# Patient Record
Sex: FEMALE | Race: WHITE | NOT HISPANIC OR LATINO | URBAN - METROPOLITAN AREA
[De-identification: names, ages, dates, MRNs, and addresses within clinical notes are randomized per-mention and may not be internally consistent; named-entity substitution may affect disease eponyms.]

---
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
## 2023-01-21 ENCOUNTER — INPATIENT (INPATIENT)
Facility: HOSPITAL | Age: 23
LOS: 0 days | Discharge: HOME | DRG: 343 | End: 2023-01-22
Attending: STUDENT IN AN ORGANIZED HEALTH CARE EDUCATION/TRAINING PROGRAM | Admitting: HOSPITALIST
Payer: MEDICAID

## 2023-01-21 VITALS
DIASTOLIC BLOOD PRESSURE: 72 MMHG | OXYGEN SATURATION: 98 % | SYSTOLIC BLOOD PRESSURE: 107 MMHG | RESPIRATION RATE: 18 BRPM | WEIGHT: 120.59 LBS | HEART RATE: 115 BPM | TEMPERATURE: 98 F

## 2023-01-21 DIAGNOSIS — K35.80 UNSPECIFIED ACUTE APPENDICITIS: ICD-10-CM

## 2023-01-21 LAB
ALBUMIN SERPL ELPH-MCNC: 3.8 G/DL — SIGNIFICANT CHANGE UP (ref 3.3–5)
ALP SERPL-CCNC: 104 U/L — SIGNIFICANT CHANGE UP (ref 40–120)
ALT FLD-CCNC: 17 U/L — SIGNIFICANT CHANGE UP (ref 10–45)
ANION GAP SERPL CALC-SCNC: 11 MMOL/L — SIGNIFICANT CHANGE UP (ref 5–17)
APPEARANCE UR: CLEAR — SIGNIFICANT CHANGE UP
AST SERPL-CCNC: 19 U/L — SIGNIFICANT CHANGE UP (ref 10–40)
BACTERIA # UR AUTO: ABNORMAL /HPF
BASOPHILS # BLD AUTO: 0.02 K/UL — SIGNIFICANT CHANGE UP (ref 0–0.2)
BASOPHILS NFR BLD AUTO: 0.2 % — SIGNIFICANT CHANGE UP (ref 0–2)
BILIRUB SERPL-MCNC: 0.4 MG/DL — SIGNIFICANT CHANGE UP (ref 0.2–1.2)
BILIRUB UR-MCNC: NEGATIVE — SIGNIFICANT CHANGE UP
BUN SERPL-MCNC: 7 MG/DL — SIGNIFICANT CHANGE UP (ref 7–23)
CALCIUM SERPL-MCNC: 8.8 MG/DL — SIGNIFICANT CHANGE UP (ref 8.4–10.5)
CHLORIDE SERPL-SCNC: 104 MMOL/L — SIGNIFICANT CHANGE UP (ref 96–108)
CO2 SERPL-SCNC: 20 MMOL/L — LOW (ref 22–31)
COLOR SPEC: YELLOW — SIGNIFICANT CHANGE UP
CREAT SERPL-MCNC: 0.54 MG/DL — SIGNIFICANT CHANGE UP (ref 0.5–1.3)
DIFF PNL FLD: ABNORMAL
EGFR: 135 ML/MIN/1.73M2 — SIGNIFICANT CHANGE UP
ELLIPTOCYTES BLD QL SMEAR: SLIGHT — SIGNIFICANT CHANGE UP
EOSINOPHIL # BLD AUTO: 0.06 K/UL — SIGNIFICANT CHANGE UP (ref 0–0.5)
EOSINOPHIL NFR BLD AUTO: 0.7 % — SIGNIFICANT CHANGE UP (ref 0–6)
EPI CELLS # UR: SIGNIFICANT CHANGE UP
GLUCOSE SERPL-MCNC: 105 MG/DL — HIGH (ref 70–99)
GLUCOSE UR QL: NEGATIVE — SIGNIFICANT CHANGE UP
HCT VFR BLD CALC: 32.8 % — LOW (ref 34.5–45)
HGB BLD-MCNC: 9.7 G/DL — LOW (ref 11.5–15.5)
HYPOCHROMIA BLD QL: SIGNIFICANT CHANGE UP
IMM GRANULOCYTES NFR BLD AUTO: 0.6 % — SIGNIFICANT CHANGE UP (ref 0–0.9)
KETONES UR-MCNC: NEGATIVE — SIGNIFICANT CHANGE UP
LEUKOCYTE ESTERASE UR-ACNC: ABNORMAL
LIDOCAIN IGE QN: 74 U/L — SIGNIFICANT CHANGE UP (ref 73–393)
LYMPHOCYTES # BLD AUTO: 0.61 K/UL — LOW (ref 1–3.3)
LYMPHOCYTES # BLD AUTO: 7.5 % — LOW (ref 13–44)
MANUAL SMEAR VERIFICATION: SIGNIFICANT CHANGE UP
MCHC RBC-ENTMCNC: 18.9 PG — LOW (ref 27–34)
MCHC RBC-ENTMCNC: 29.6 GM/DL — LOW (ref 32–36)
MCV RBC AUTO: 63.9 FL — LOW (ref 80–100)
MICROCYTES BLD QL: SIGNIFICANT CHANGE UP
MONOCYTES # BLD AUTO: 0.47 K/UL — SIGNIFICANT CHANGE UP (ref 0–0.9)
MONOCYTES NFR BLD AUTO: 5.8 % — SIGNIFICANT CHANGE UP (ref 2–14)
NEUTROPHILS # BLD AUTO: 6.88 K/UL — SIGNIFICANT CHANGE UP (ref 1.8–7.4)
NEUTROPHILS NFR BLD AUTO: 85.2 % — HIGH (ref 43–77)
NITRITE UR-MCNC: NEGATIVE — SIGNIFICANT CHANGE UP
NRBC # BLD: 0 /100 WBCS — SIGNIFICANT CHANGE UP (ref 0–0)
PH UR: 5 — SIGNIFICANT CHANGE UP (ref 5–8)
PLAT MORPH BLD: NORMAL — SIGNIFICANT CHANGE UP
PLATELET # BLD AUTO: 295 K/UL — SIGNIFICANT CHANGE UP (ref 150–400)
POTASSIUM SERPL-MCNC: 3.7 MMOL/L — SIGNIFICANT CHANGE UP (ref 3.5–5.3)
POTASSIUM SERPL-SCNC: 3.7 MMOL/L — SIGNIFICANT CHANGE UP (ref 3.5–5.3)
PROT SERPL-MCNC: 7.3 G/DL — SIGNIFICANT CHANGE UP (ref 6–8.3)
PROT UR-MCNC: 30 MG/DL
RBC # BLD: 5.13 M/UL — SIGNIFICANT CHANGE UP (ref 3.8–5.2)
RBC # FLD: 18.1 % — HIGH (ref 10.3–14.5)
RBC BLD AUTO: ABNORMAL
RBC CASTS # UR COMP ASSIST: ABNORMAL /HPF (ref 0–4)
SARS-COV-2 RNA SPEC QL NAA+PROBE: SIGNIFICANT CHANGE UP
SODIUM SERPL-SCNC: 135 MMOL/L — SIGNIFICANT CHANGE UP (ref 135–145)
SP GR SPEC: 1.02 — SIGNIFICANT CHANGE UP (ref 1.01–1.02)
UROBILINOGEN FLD QL: NEGATIVE — SIGNIFICANT CHANGE UP
WBC # BLD: 8.09 K/UL — SIGNIFICANT CHANGE UP (ref 3.8–10.5)
WBC # FLD AUTO: 8.09 K/UL — SIGNIFICANT CHANGE UP (ref 3.8–10.5)
WBC UR QL: >50 /HPF (ref 0–5)

## 2023-01-21 DEVICE — STAPLER COVIDIEN TRI-STAPLE 45MM TAN RELOAD: Type: IMPLANTABLE DEVICE | Status: FUNCTIONAL

## 2023-01-21 RX ORDER — SODIUM CHLORIDE 9 MG/ML
1000 INJECTION, SOLUTION INTRAVENOUS
Refills: 0 | Status: DISCONTINUED | OUTPATIENT
Start: 2023-01-21 | End: 2023-01-21

## 2023-01-21 RX ORDER — PIPERACILLIN AND TAZOBACTAM 4; .5 G/20ML; G/20ML
3.38 INJECTION, POWDER, LYOPHILIZED, FOR SOLUTION INTRAVENOUS EVERY 8 HOURS
Refills: 0 | Status: DISCONTINUED | OUTPATIENT
Start: 2023-01-21 | End: 2023-01-22

## 2023-01-21 RX ORDER — ONDANSETRON 8 MG/1
4 TABLET, FILM COATED ORAL ONCE
Refills: 0 | Status: COMPLETED | OUTPATIENT
Start: 2023-01-21 | End: 2023-01-21

## 2023-01-21 RX ORDER — ONDANSETRON 8 MG/1
4 TABLET, FILM COATED ORAL EVERY 8 HOURS
Refills: 0 | Status: DISCONTINUED | OUTPATIENT
Start: 2023-01-21 | End: 2023-01-22

## 2023-01-21 RX ORDER — ONDANSETRON 8 MG/1
4 TABLET, FILM COATED ORAL ONCE
Refills: 0 | Status: DISCONTINUED | OUTPATIENT
Start: 2023-01-21 | End: 2023-01-21

## 2023-01-21 RX ORDER — PIPERACILLIN AND TAZOBACTAM 4; .5 G/20ML; G/20ML
3.38 INJECTION, POWDER, LYOPHILIZED, FOR SOLUTION INTRAVENOUS ONCE
Refills: 0 | Status: DISCONTINUED | OUTPATIENT
Start: 2023-01-21 | End: 2023-01-21

## 2023-01-21 RX ORDER — SODIUM CHLORIDE 9 MG/ML
1000 INJECTION INTRAMUSCULAR; INTRAVENOUS; SUBCUTANEOUS ONCE
Refills: 0 | Status: COMPLETED | OUTPATIENT
Start: 2023-01-21 | End: 2023-01-21

## 2023-01-21 RX ORDER — MORPHINE SULFATE 50 MG/1
2 CAPSULE, EXTENDED RELEASE ORAL ONCE
Refills: 0 | Status: DISCONTINUED | OUTPATIENT
Start: 2023-01-21 | End: 2023-01-21

## 2023-01-21 RX ORDER — MORPHINE SULFATE 50 MG/1
4 CAPSULE, EXTENDED RELEASE ORAL EVERY 6 HOURS
Refills: 0 | Status: DISCONTINUED | OUTPATIENT
Start: 2023-01-21 | End: 2023-01-22

## 2023-01-21 RX ORDER — HYDROMORPHONE HYDROCHLORIDE 2 MG/ML
0.5 INJECTION INTRAMUSCULAR; INTRAVENOUS; SUBCUTANEOUS
Refills: 0 | Status: DISCONTINUED | OUTPATIENT
Start: 2023-01-21 | End: 2023-01-21

## 2023-01-21 RX ORDER — HYDROMORPHONE HYDROCHLORIDE 2 MG/ML
1 INJECTION INTRAMUSCULAR; INTRAVENOUS; SUBCUTANEOUS
Refills: 0 | Status: DISCONTINUED | OUTPATIENT
Start: 2023-01-21 | End: 2023-01-21

## 2023-01-21 RX ORDER — PIPERACILLIN AND TAZOBACTAM 4; .5 G/20ML; G/20ML
3.38 INJECTION, POWDER, LYOPHILIZED, FOR SOLUTION INTRAVENOUS ONCE
Refills: 0 | Status: COMPLETED | OUTPATIENT
Start: 2023-01-21 | End: 2023-01-21

## 2023-01-21 RX ORDER — MORPHINE SULFATE 50 MG/1
2 CAPSULE, EXTENDED RELEASE ORAL EVERY 6 HOURS
Refills: 0 | Status: DISCONTINUED | OUTPATIENT
Start: 2023-01-21 | End: 2023-01-22

## 2023-01-21 RX ORDER — LANOLIN ALCOHOL/MO/W.PET/CERES
3 CREAM (GRAM) TOPICAL AT BEDTIME
Refills: 0 | Status: DISCONTINUED | OUTPATIENT
Start: 2023-01-21 | End: 2023-01-22

## 2023-01-21 RX ORDER — ACETAMINOPHEN 500 MG
650 TABLET ORAL EVERY 6 HOURS
Refills: 0 | Status: DISCONTINUED | OUTPATIENT
Start: 2023-01-21 | End: 2023-01-22

## 2023-01-21 RX ADMIN — PIPERACILLIN AND TAZOBACTAM 200 GRAM(S): 4; .5 INJECTION, POWDER, LYOPHILIZED, FOR SOLUTION INTRAVENOUS at 14:41

## 2023-01-21 RX ADMIN — MORPHINE SULFATE 2 MILLIGRAM(S): 50 CAPSULE, EXTENDED RELEASE ORAL at 11:01

## 2023-01-21 RX ADMIN — SODIUM CHLORIDE 1000 MILLILITER(S): 9 INJECTION INTRAMUSCULAR; INTRAVENOUS; SUBCUTANEOUS at 11:01

## 2023-01-21 RX ADMIN — ONDANSETRON 4 MILLIGRAM(S): 8 TABLET, FILM COATED ORAL at 11:01

## 2023-01-21 RX ADMIN — PIPERACILLIN AND TAZOBACTAM 25 GRAM(S): 4; .5 INJECTION, POWDER, LYOPHILIZED, FOR SOLUTION INTRAVENOUS at 21:11

## 2023-01-21 NOTE — ED ADULT NURSE REASSESSMENT NOTE - NS ED NURSE REASSESS COMMENT FT1
All patient belongings including patient phone and gold colored necklace given to Alvaro patient's boyfriend at bedside.

## 2023-01-21 NOTE — ED PROVIDER NOTE - OBJECTIVE STATEMENT
22 yr old female with no pmhx presents with abdominal pain since 4 am which woke her up from sleep. associated nausea and vomiting. No diarrhea. No known fever, chills, chest pain, sob, urinary complaints, diarrhea, or any other  Pt sexual active, last period about 2 week ago, received BC shot about 2 weeks ago. No vaginal discharge or bleeding.

## 2023-01-21 NOTE — PROGRESS NOTE ADULT - ASSESSMENT
PLAN:    -serial exams, any changes in exam to be escelated to surgical team immedietly  -pain control  -finish post op anbx  -IVFs until taking adequate PO  -advance diet per surgical team  -dressing changes per surgical team  -DVT prophylaxis  -AM labs

## 2023-01-21 NOTE — ED PROVIDER NOTE - PHYSICAL EXAMINATION
Encounter for palliative care
Gen:  alert, awake, no acute distress  Head:  atraumatic, normocephalic  HEENT: PERRLA, EOMI, normal nose, normal oropharynx, no tonsillar edema, erythema, or exudate  CV:  rrr, nl S1, S2, no m/r/g  Pulm:  lungs CTA b/l  Abd: soft non distended, ttp rlq, +rovsing  MSK:  moving all extremities, no back midline ttp, no stepoffs, no cva TTP  Neuro:  grossly intact, no focal deficits  Skin:  clear, dry, intact  Psych: AOx3, normal affect, no apparent risk to self or others

## 2023-01-21 NOTE — CONSULT NOTE ADULT - CONSULT REASON
From: Chase Roberts  To: Tyron Georges NP  Sent: 2/22/2018 11:37 AM EST  Subject: Medication Renewal Request    Original authorizing provider: RICKIE Hess.  Chicho Ferrer would like a refill of the following medications:  PROAIR HFA 90 mcg/actuation inhaler Tyron Georges NP]  SUMAtriptan (IMITREX) 100 mg tablet Tyron Georges NP]    Preferred pharmacy: [address] [place] [address]    Comment:  a new prescibiton needs to be sent in the old one expires on the dates above    Medication renewals requested in this message routed to other providers:  EPINEPHrine (EPIPEN 2-AWA) 0.3 mg/0.3 mL injection Beatriz Acevedo MD]  omeprazole (PRILOSEC) 20 mg capsule Cristi Elias NP]
meds sent
abdominal pain

## 2023-01-21 NOTE — ED PROVIDER NOTE - CLINICAL SUMMARY MEDICAL DECISION MAKING FREE TEXT BOX
22 yr old female with no pmhx presents with abdominal pain since 4 am which woke her up from sleep. associated nausea and vomiting. No diarrhea. No known fever, chills, chest pain, sob, urinary complaints, diarrhea, or any other  Pt sexual active, last period about 2 week ago, received BC shot about 2 weeks ago. No vaginal discharge or bleeding.   RLQ tenderness on exam , pelvic unremarkable   will check labs, ct abd/pelvis r/o appendicitis 22 yr old female with no pmhx presents with abdominal pain since 4 am which woke her up from sleep. associated nausea and vomiting. No diarrhea. No known fever, chills, chest pain, sob, urinary complaints, diarrhea, or any other  Pt sexual active, last period about 2 week ago, received BC shot about 2 weeks ago. No vaginal discharge or bleeding.   RLQ tenderness on exam , pelvic unremarkable   will check labs, ct abd/pelvis r/o appendicitis    +appendictis, admission, d/w dr wilhelm and dr palmer

## 2023-01-21 NOTE — CONSULT NOTE ADULT - SUBJECTIVE AND OBJECTIVE BOX
Enoch #[id number]     22 year old Bhutanese speaking female with boyfriend by her side presented to the ER c/o severe abdominal pain which began yesterday am . Patient states that she has not been  able to eat anything since yesterday .    She also c/o fever, nausea and vomiting several times.    She c/o of pain when  ambulating and it subsides when lying still.   Her last menstrual period was Jan 1st.  She has an irregular cycle with intermittent bleeding.   She saw gynecologist last week and is presently on BCP's.   She denies dysuria.      CT scan   < from: CT Abdomen and Pelvis w/ Oral Cont and w/ IV Cont (01.21.23 @ 13:23) >    BOWEL: No bowel obstruction. Appendix is abnormally thickened measuring   8-10 mm. There is evidence of multiple appendicoliths. The appendix is   best delineated on sagittal imaging (601:50-56). No discernible   periappendiceal inflammatory change.  PERITONEUM: No ascites.  VESSELS: Within normal limits.  RETROPERITONEUM/LYMPH NODES: No lymphadenopathy.  ABDOMINAL WALL:Within normal limits.  BONES: Within normal limits.    IMPRESSION:  Imaging findings are suggestive of acute appendicitis with evidence of   appendicoliths but need to be correlated clinically.    < end of copied text >    Vital Signs Last 24 Hrs  T(C): 36.4 (21 Jan 2023 10:10), Max: 36.4 (21 Jan 2023 10:10)  T(F): 97.5 (21 Jan 2023 10:10), Max: 97.5 (21 Jan 2023 10:10)  HR: 94 (21 Jan 2023 15:42) (86 - 115)  BP: 107/72 (21 Jan 2023 10:10) (107/72 - 107/72)  RR: 18 (21 Jan 2023 15:42) (18 - 18)  SpO2: 97% (21 Jan 2023 15:42) (97% - 98%)    Parameters below as of 21 Jan 2023 10:10  Patient On (Oxygen Delivery Method): room air    SH:  Single , works at [employer],           Lives with boyfriend    Tob:  daily Vaping   ETOH:  socially     PAST MEDICAL & SURGICAL HISTORY:  No pertinent past medical history      No significant past surgical history      Home Medications:  BCP    PE:  Alert and oriented X 3   Lungs:  CTA   Cor:  RR   Abd:   softly distended, + tenderness in the periumbilical area , RUQ , RLQ and some radiation to the LLQ  voiding, denies dysuria  Ext:  w/o edema w/o calf discomfort                          9.7    8.09  )-----------( 295      ( 21 Jan 2023 10:30 )             32.8   01-21    135  |  104  |  7   ----------------------------<  105<H>  3.7   |  20<L>  |  0.54    Ca    8.8      21 Jan 2023 10:30    TPro  7.3  /  Alb  3.8  /  TBili  0.4  /  DBili  x   /  AST  19  /  ALT  17  /  AlkPhos  104  01-21    Urine pregnancy   Covid test pending   Enoch #[id number]     22 year old Cambodian speaking female with boyfriend by her side presented to the ER c/o severe abdominal pain which began yesterday am . Patient states that she has not been  able to eat anything since yesterday .    She also c/o fever, nausea and vomiting several times.    She c/o of pain when  ambulating and it subsides when lying still.   Her last menstrual period was Jan 1st.  She has an irregular cycle with intermittent bleeding.   She saw gynecologist last week and is presently on BCP's.   She denies dysuria.      CT scan   < from: CT Abdomen and Pelvis w/ Oral Cont and w/ IV Cont (01.21.23 @ 13:23) >    BOWEL: No bowel obstruction. Appendix is abnormally thickened measuring   8-10 mm. There is evidence of multiple appendicoliths. The appendix is   best delineated on sagittal imaging (601:50-56). No discernible   periappendiceal inflammatory change.  PERITONEUM: No ascites.  VESSELS: Within normal limits.  RETROPERITONEUM/LYMPH NODES: No lymphadenopathy.  ABDOMINAL WALL:Within normal limits.  BONES: Within normal limits.    IMPRESSION:  Imaging findings are suggestive of acute appendicitis with evidence of   appendicoliths but need to be correlated clinically.    < end of copied text >    Vital Signs Last 24 Hrs  T(C): 36.4 (21 Jan 2023 10:10), Max: 36.4 (21 Jan 2023 10:10)  T(F): 97.5 (21 Jan 2023 10:10), Max: 97.5 (21 Jan 2023 10:10)  HR: 94 (21 Jan 2023 15:42) (86 - 115)  BP: 107/72 (21 Jan 2023 10:10) (107/72 - 107/72)  RR: 18 (21 Jan 2023 15:42) (18 - 18)  SpO2: 97% (21 Jan 2023 15:42) (97% - 98%)    Parameters below as of 21 Jan 2023 10:10  Patient On (Oxygen Delivery Method): room air    SH:  Single , works at [employer],           Lives with boyfriend    Tob:  daily Vaping   ETOH:  socially     PAST MEDICAL & SURGICAL HISTORY:  No pertinent past medical history      No significant past surgical history      Home Medications:  BCP    PE:  Alert and oriented X 3   Lungs:  CTA   Cor:  RR   Abd:   softly distended, + tenderness in the periumbilical area , RUQ , RLQ and some radiation to the LLQ  voiding, denies dysuria  Ext:  w/o edema w/o calf discomfort                          9.7    8.09  )-----------( 295      ( 21 Jan 2023 10:30 )             32.8   01-21    135  |  104  |  7   ----------------------------<  105<H>  3.7   |  20<L>  |  0.54    Ca    8.8      21 Jan 2023 10:30    TPro  7.3  /  Alb  3.8  /  TBili  0.4  /  DBili  x   /  AST  19  /  ALT  17  /  AlkPhos  104  01-21    Urine pregnancy negative   Covid test pending

## 2023-01-21 NOTE — H&P ADULT - NSHPLABSRESULTS_GEN_ALL_CORE
.                            9.7    8.09  )-----------( 295      ( 2023 10:30 )             32.8           135  |  104  |  7   ----------------------------<  105  3.7   |  20  |  0.54    Ca    8.8      2023 10:30    TPro  7.3  /  Alb  3.8  /  TBili  0.4  /  DBili  x   /  AST  19  /  ALT  17  /  AlkPhos  104      Urinalysis Basic - ( 2023 10:30 )    Color: Yellow / Appearance: Clear / S.020 / pH: x  Gluc: x / Ketone: Negative  / Bili: Negative / Urobili: Negative   Blood: x / Protein: 30 mg/dL / Nitrite: Negative   Leuk Esterase: Moderate / RBC: 5-10 /HPF / WBC >50 /HPF   Sq Epi: x / Non Sq Epi: Neg.-Few / Bacteria: Few /HPF      Lipase, Serum: 74 U/L (23 @ 10:30)    EKG: PENDING    < from: Xray Chest 1 View AP/PA (23 @ 14:54) >    No acute pulmonary pathology.    < from: CT Abdomen and Pelvis w/ Oral Cont and w/ IV Cont (23 @ 13:23) >    Imaging findings are suggestive of acute appendicitis with evidence of   appendicoliths but need to be correlated clinically.    < end of copied text >    Case d/w Dr Tavarez - surgery - states will take patient to OR today

## 2023-01-21 NOTE — H&P ADULT - ASSESSMENT
22F Malay-speaking with no sig PMHx a/w acute abdominal pain and dx with acute appendicitis    #Acute appendicitis with localized peritonitis  -Pain control  -NPO   -OR today  -Low risk for surgery. Medically optimized pending EKG.    #Preoperative Assessment  Revised Cardiac Risk Assessment   [  ]History of ischemic heart disease   [  ]History of congestive heart failure   [  ]History of cerebrovascular disease (stroke or transient ischemic attack)   [  ]History of diabetes requiring preoperative insulin use   [  ]Chronic kidney disease (creatinine > 2 mg/dL)   [  ]Undergoing suprainguinal vascular, intraperitoneal, or intrathoracic surgery     0 predictors = 0.4%, 1 predictor = 1.0%, 2 predictors = 2.4%, > 3 predictors = 5.4%    * Overall this patient has a 0.4    % risk of cardiac death, nonfatal myocardial infarction, and nonfatal cardiac arrest perioperatively.     Patient does not have any known history of severe valvular disease and is not in decompensated CHF. No recent MI. Baseline functional capacity is > 4 METS. Patient is currently hemodynamically stable. Patient is medically optimized at this time despite the inherent risks of surgery. (EKG pending)    Case d/w patient and patient's boyfriend (with permission) who was present at bedside for encounter.    22F Telugu-speaking with no sig PMHx a/w acute abdominal pain and dx with acute appendicitis    #Acute appendicitis with localized peritonitis  -Pain control  -NPO   -OR today  -Low risk for surgery. Medically optimized pending EKG.    #Microcytic Anemia  -Will need outpatient workup for this  -Check ferritin in AM (if still here)      #Preoperative Assessment  Revised Cardiac Risk Assessment   [  ]History of ischemic heart disease   [  ]History of congestive heart failure   [  ]History of cerebrovascular disease (stroke or transient ischemic attack)   [  ]History of diabetes requiring preoperative insulin use   [  ]Chronic kidney disease (creatinine > 2 mg/dL)   [  ]Undergoing suprainguinal vascular, intraperitoneal, or intrathoracic surgery     0 predictors = 0.4%, 1 predictor = 1.0%, 2 predictors = 2.4%, > 3 predictors = 5.4%    * Overall this patient has a 0.4    % risk of cardiac death, nonfatal myocardial infarction, and nonfatal cardiac arrest perioperatively.     Patient does not have any known history of severe valvular disease and is not in decompensated CHF. No recent MI. Baseline functional capacity is > 4 METS. Patient is currently hemodynamically stable. Patient is medically optimized at this time despite the inherent risks of surgery. (EKG pending)    Case d/w patient and patient's boyfriend (with permission) who was present at bedside for encounter.    22F Lithuanian-speaking with no sig PMHx a/w acute abdominal pain and dx with acute appendicitis    #Acute appendicitis with localized peritonitis  -Pain control  -NPO   -OR today  -Low risk for surgery. Medically optimized pending EKG.    #Microcytic Anemia  -Will need outpatient workup for this  -Check ferritin in AM (if still here)    #Abnormal urinalysis  -"Asymptomatic"  -Repeat as outpatient      #Preoperative Assessment  Revised Cardiac Risk Assessment   [  ]History of ischemic heart disease   [  ]History of congestive heart failure   [  ]History of cerebrovascular disease (stroke or transient ischemic attack)   [  ]History of diabetes requiring preoperative insulin use   [  ]Chronic kidney disease (creatinine > 2 mg/dL)   [  ]Undergoing suprainguinal vascular, intraperitoneal, or intrathoracic surgery     0 predictors = 0.4%, 1 predictor = 1.0%, 2 predictors = 2.4%, > 3 predictors = 5.4%    * Overall this patient has a 0.4    % risk of cardiac death, nonfatal myocardial infarction, and nonfatal cardiac arrest perioperatively.     Patient does not have any known history of severe valvular disease and is not in decompensated CHF. No recent MI. Baseline functional capacity is > 4 METS. Patient is currently hemodynamically stable. Patient is medically optimized at this time despite the inherent risks of surgery. (EKG pending)    Case d/w patient and patient's boyfriend (with permission) who was present at bedside for encounter.

## 2023-01-21 NOTE — H&P ADULT - NSHPPHYSICALEXAM_GEN_ALL_CORE
Vital Signs Last 24 Hrs  T(F): 97.5 (21 Jan 2023 10:10), Max: 97.5 (21 Jan 2023 10:10)  HR: 94 (21 Jan 2023 15:42) (86 - 115)  BP: 107/72 (21 Jan 2023 10:10) (107/72 - 107/72)  RR: 18 (21 Jan 2023 15:42) (18 - 18)  SpO2: 97% (21 Jan 2023 15:42) (97% - 98%)    PHYSICAL EXAM:  GENERAL: NAD  HEAD:  Atraumatic, Normocephalic  EYES: EOMI, conjunctiva and sclera clear  ENMT: No gross hearing impairment, Moist mucous membranes, Good dentition, no thrush  NECK: Supple, No JVD  CHEST/LUNG: Clear to auscultation bilaterally, good air entry, non-labored breathing  HEART: RRR; S1/S2, No murmur  ABDOMEN: Soft, lower abdominal tenderness right>left, mildly distended; Bowel sounds present  VASCULAR: Normal pulses, Normal capillary refill  EXTREMITIES: No calf tenderness, No cyanosis, No pitting edema  LYMPH: Normal; No lymphadenopathy noted  SKIN: Warm, Intact, No rashes noted  PSYCH: Normal mood, Normal affect  NERVOUS SYSTEM:  A/O x3, Good concentration; CN 2-12 intact, No focal deficits

## 2023-01-21 NOTE — ED PROVIDER NOTE - ATTENDING APP SHARED VISIT CONTRIBUTION OF CARE
Dr. Kuo: I performed a face to face bedside interview with patient regarding history of present illness, review of symptoms and past medical history. I completed an independent physical exam.  I have discussed patient's plan of care with PA.   I agree with note as stated above, having amended the EMR as needed to reflect my findings.   This includes HISTORY OF PRESENT ILLNESS, HIV, PAST MEDICAL/SURGICAL/FAMILY/SOCIAL HISTORY, ALLERGIES AND HOME MEDICATIONS, REVIEW OF SYSTEMS, PHYSICAL EXAM, and any PROGRESS NOTES during the time I functioned as the attending physician for this patient.    dr kuo: 22 yr old female with no pmhx presents with abdominal pain since 4 am which woke her up from sleep. associated nausea and vomiting. No diarrhea. No known fever, chills, chest pain, sob, urinary complaints, diarrhea, or any other  Pt sexual active, last period about 2 week ago, received BC shot about 2 weeks ago. No vaginal discharge or bleeding.   RLQ tenderness on exam , pelvic unremarkable   will check labs, ct abd/pelvis r/o appendicitis    +appendictis, admission, d/w dr wilhelm and dr palmer

## 2023-01-21 NOTE — H&P ADULT - HISTORY OF PRESENT ILLNESS
22F Mohawk-speaking p/w acute onset abdominal pain, started at 8AM, migrated from center of abdomen to left side and now to lower right side, associated with nausea and vomiting, last BM yesterday, + flatus, 10/10 "severe" at its peak. Never experienced this before. Takes no meds at home. No sig PMHx

## 2023-01-21 NOTE — PROGRESS NOTE ADULT - SUBJECTIVE AND OBJECTIVE BOX
F/U Note:    22y Female admitted POD #0 from LAP appy        Vital Signs Last 24 Hrs  T(C): 37.2 (21 Jan 2023 20:15), Max: 37.7 (21 Jan 2023 19:18)  T(F): 99 (21 Jan 2023 20:15), Max: 99.9 (21 Jan 2023 19:18)  HR: 74 (21 Jan 2023 20:15) (73 - 115)  BP: 100/55 (21 Jan 2023 20:15) (93/49 - 107/72)  BP(mean): --  RR: 16 (21 Jan 2023 20:15) (16 - 18)  SpO2: 100% (21 Jan 2023 20:15) (97% - 100%)    Parameters below as of 21 Jan 2023 19:18  Patient On (Oxygen Delivery Method): nasal cannula  O2 Flow (L/min): 2                              9.7    8.09  )-----------( 295      ( 21 Jan 2023 10:30 )             32.8         01-21    135  |  104  |  7   ----------------------------<  105<H>  3.7   |  20<L>  |  0.54    Ca    8.8      21 Jan 2023 10:30    TPro  7.3  /  Alb  3.8  /  TBili  0.4  /  DBili  x   /  AST  19  /  ALT  17  /  AlkPhos  104  01-21                NEURO: awake and alert  CV: (+) S1/S2, rrr, no mrg  RESP: CTA b/l  GI: soft, non tender

## 2023-01-21 NOTE — CONSULT NOTE ADULT - ASSESSMENT
22 year old female with hx of vaping, and irregular menses, now presents with acute appendicitis .  --anemia     Plan:  Discussed with Surgeon Dr Tavarez           Scheduled for OR pending covid test / evaluation by Dr Tavarez 22 year old female with hx of vaping, and irregular menses, now presents with acute appendicitis .  --anemia     Plan:  Discussed with Surgeon Dr Corby dumont started            Scheduled for OR pending covid test / evaluation by Dr Tavarez

## 2023-01-22 VITALS
RESPIRATION RATE: 15 BRPM | DIASTOLIC BLOOD PRESSURE: 71 MMHG | OXYGEN SATURATION: 98 % | HEART RATE: 65 BPM | SYSTOLIC BLOOD PRESSURE: 112 MMHG | TEMPERATURE: 97 F

## 2023-01-22 LAB
FERRITIN SERPL-MCNC: 8 NG/ML — LOW (ref 15–150)
HCT VFR BLD CALC: 30.2 % — LOW (ref 34.5–45)
HGB BLD-MCNC: 8.9 G/DL — LOW (ref 11.5–15.5)
IRON SATN MFR SERPL: 15 UG/DL — LOW (ref 30–160)
IRON SATN MFR SERPL: 3 % — LOW (ref 14–50)
MCHC RBC-ENTMCNC: 18.9 PG — LOW (ref 27–34)
MCHC RBC-ENTMCNC: 29.5 GM/DL — LOW (ref 32–36)
MCV RBC AUTO: 64.3 FL — LOW (ref 80–100)
NRBC # BLD: 0 /100 WBCS — SIGNIFICANT CHANGE UP (ref 0–0)
PLATELET # BLD AUTO: 282 K/UL — SIGNIFICANT CHANGE UP (ref 150–400)
RBC # BLD: 4.7 M/UL — SIGNIFICANT CHANGE UP (ref 3.8–5.2)
RBC # FLD: 18.1 % — HIGH (ref 10.3–14.5)
TIBC SERPL-MCNC: 455 UG/DL — HIGH (ref 220–430)
UIBC SERPL-MCNC: 440 UG/DL — HIGH (ref 110–370)
WBC # BLD: 3.89 K/UL — SIGNIFICANT CHANGE UP (ref 3.8–10.5)
WBC # FLD AUTO: 3.89 K/UL — SIGNIFICANT CHANGE UP (ref 3.8–10.5)

## 2023-01-22 RX ORDER — FERROUS SULFATE 325(65) MG
1 TABLET ORAL
Qty: 30 | Refills: 0
Start: 2023-01-22 | End: 2023-02-20

## 2023-01-22 RX ORDER — ACETAMINOPHEN 500 MG
2 TABLET ORAL
Qty: 0 | Refills: 0 | DISCHARGE
Start: 2023-01-22

## 2023-01-22 RX ADMIN — PIPERACILLIN AND TAZOBACTAM 25 GRAM(S): 4; .5 INJECTION, POWDER, LYOPHILIZED, FOR SOLUTION INTRAVENOUS at 05:43

## 2023-01-22 RX ADMIN — MORPHINE SULFATE 2 MILLIGRAM(S): 50 CAPSULE, EXTENDED RELEASE ORAL at 00:20

## 2023-01-22 NOTE — PROGRESS NOTE ADULT - SUBJECTIVE AND OBJECTIVE BOX
INTERVAL HPI/OVERNIGHT EVENTS:  Patient seen, Ukrainian speaking, communicated using [facility] Interpret ID#[id number].  She denies pain, denies N/V. Admits flatus.  Says she feels good and is eager for discharge home.     MEDICATIONS  (STANDING):  piperacillin/tazobactam IVPB.. 3.375 Gram(s) IV Intermittent every 8 hours    MEDICATIONS  (PRN):  acetaminophen     Tablet .. 650 milliGRAM(s) Oral every 6 hours PRN Temp greater or equal to 38C (100.4F), Mild Pain (1 - 3)  aluminum hydroxide/magnesium hydroxide/simethicone Suspension 30 milliLiter(s) Oral every 4 hours PRN Dyspepsia  melatonin 3 milliGRAM(s) Oral at bedtime PRN Insomnia  morphine  - Injectable 2 milliGRAM(s) IV Push every 6 hours PRN Moderate Pain (4 - 6)  morphine  - Injectable 4 milliGRAM(s) IV Push every 6 hours PRN Severe Pain (7 - 10)  ondansetron Injectable 4 milliGRAM(s) IV Push every 8 hours PRN Nausea and/or Vomiting      Allergies    No Known Allergies    Vital Signs Last 24 Hrs  T(C): 36.3 (2023 05:29), Max: 37.7 (2023 19:18)  T(F): 97.4 (2023 05:29), Max: 99.9 (2023 19:18)  HR: 64 (2023 05:29) (64 - 94)  BP: 93/51 (2023 05:29) (93/49 - 106/56)  BP(mean): --  RR: 15 (2023 05:29) (15 - 18)  SpO2: 98% (2023 05:29) (97% - 100%)    Parameters below as of 2023 05:29  Patient On (Oxygen Delivery Method): room air    General: NAD, comfortable.  Pulm: CTA  Cardiac: S1, S2, RRR  Abd: soft.  Incisions, no drainage, no erythema.  Calves soft, NT.    LABS:                        8.9    3.89  )-----------( 282      ( 2023 06:45 )             30.2     01-21    135  |  104  |  7   ----------------------------<  105<H>  3.7   |  20<L>  |  0.54    Ca    8.8      2023 10:30    TPro  7.3  /  Alb  3.8  /  TBili  0.4  /  DBili  x   /  AST  19  /  ALT  17  /  AlkPhos  104  -      Urinalysis Basic - ( 2023 10:30 )    Color: Yellow / Appearance: Clear / S.020 / pH: x  Gluc: x / Ketone: Negative  / Bili: Negative / Urobili: Negative   Blood: x / Protein: 30 mg/dL / Nitrite: Negative   Leuk Esterase: Moderate / RBC: 5-10 /HPF / WBC >50 /HPF   Sq Epi: x / Non Sq Epi: Neg.-Few / Bacteria: Few /HPF        RADIOLOGY & ADDITIONAL TESTS:

## 2023-01-22 NOTE — DISCHARGE NOTE NURSING/CASE MANAGEMENT/SOCIAL WORK - PATIENT PORTAL LINK FT
You can access the FollowMyHealth Patient Portal offered by [facility] by registering at the following website: [email or web address]. By joining [facility]’s FollowMyHealth portal, you will also be able to view your health information using other applications (apps) compatible with our system.

## 2023-01-22 NOTE — DISCHARGE NOTE PROVIDER - CARE PROVIDER_API CALL
Kendrick Tavarez)  Surgery  [address] [place] [address]  Phone: [phone]  Fax: [phone]  Follow Up Time: 2 weeks

## 2023-01-22 NOTE — DISCHARGE NOTE PROVIDER - ATTENDING DISCHARGE PHYSICAL EXAMINATION:
General: NAD  CV: S1S2, RRR  Resp: CTA bilaterally  GI: Abdomen soft, mildly TTP at lower abdomen surgical site c/d/i

## 2023-01-22 NOTE — DISCHARGE NOTE PROVIDER - NSDCMRMEDTOKEN_GEN_ALL_CORE_FT
acetaminophen 325 mg oral tablet: 2 tab(s) orally every 6 hours, As needed, Temp greater or equal to 38C (100.4F), Mild Pain (1 - 3)  ferrous sulfate 325 mg (65 mg elemental iron) oral tablet: 1 tab(s) orally once a day

## 2023-01-22 NOTE — DISCHARGE NOTE PROVIDER - HOSPITAL COURSE
HPI:  22F Swiss-speaking p/w acute onset abdominal pain, started at 8AM, migrated from center of abdomen to left side and now to lower right side, associated with nausea and vomiting, last BM yesterday, + flatus, 10/10 "severe" at its peak. Never experienced this before. Takes no meds at home. No sig PMHx (21 Jan 2023 15:42)    Patient was admitted for appendicitis, underwent lap appendectomy on 1/21/23 with Dr. Tavarez. Treated with IV Zosyn. Case discussed with surgical ARIANNA Seaman on day of discharge 1/22/23 - okay to discharge home today with no PO Abx, f/u in 2 weeks. Patient also found to have anemia, labs c/w HARPREET. Discharge/follow up plan discussed with patient at bedside, expressed understanding.    Source of Infection: Appendicitis  Antibiotic / Last Day: IV Zosyn periop / 1-22-23    Code Status: Full Code    Discharging Provider:  Babatunde Sheppard D.O.  Contact Info: Cell [phone] - Please call with any questions or concerns.    Outpatient Provider: None

## 2023-01-22 NOTE — PROGRESS NOTE ADULT - ASSESSMENT
POD#1 s/p lap appy, doing well.    -discharge home, f/u with Dr. Swenson in 2 weeks.  -call MD if fever, chills, severe pain.     Case discussed with Dr. Tavarez and hospitalist POD#1 s/p lap appy, doing well.    -advance diet to regular, discharge home if tolerates diet, f/u with Dr. Swenson in 2 weeks.  -call MD if fever, chills, severe pain.     Case discussed with Dr. Tavarez and hospitalist

## 2023-01-22 NOTE — DISCHARGE NOTE PROVIDER - NSFOLLOWUPCLINICS_GEN_ALL_ED_FT
[facility]  Family Medicine  [address] [place] [id number]  Phone: [phone]  Fax:   Follow Up Time: 1 week

## 2023-01-22 NOTE — DISCHARGE NOTE PROVIDER - NSDCCPCAREPLAN_GEN_ALL_CORE_FT
PRINCIPAL DISCHARGE DIAGNOSIS  Diagnosis: Acute appendicitis  Assessment and Plan of Treatment: Usted fue diagnosticado con apendicitis, tratado con antibióticos y apendicectomía con el cirujano Dr. Tavarez.  Manténgase orlando hidratado y camine según lo tolere.  Deberá seguir la llamada para programar edwardo russell con love cirujano, el Dr. Tavarez, en 2 semanas.  Llame al consultorio antes si tiene algún problema, incluido el empeoramiento del dolor abdominal, fiebre o escalofríos.     ----     You were admitted for appendicitis, treated with antibiotics and appendectomy with Dr. Tavarez.  Stay well hydrated and walk around as tolerated.  You will need to follow call to schedule an appointment with your surgeon Dr. Tavarez in 2 weeks.  Call the office sooner if you have any problems including worsening abdominal pain, fevers or chills.      SECONDARY DISCHARGE DIAGNOSES  Diagnosis: Iron deficiency anemia  Assessment and Plan of Treatment: Le diagnosticaron anemia (recuento sanguíneo bajo), que probablemente se deba a linwood períodos menstruales.  Puede comenzar a kirby un suplemento de krystal, con jugo de naranja, cuando se sienta mejor después de la cirugía (lo enviado a [facility]).  Es importante que obtiene edwardo russell con un médico de atención primaria para hablar sobre el inicio de los suplementos de krystal, controlar linwood recuentos sanguíneos y mari a un ginecólogo.     ----     You were found to have anemia (low blood counts), which is likely due to your menstrual periods.   You can start taking an iron supplement, with orange juice, when you are feeling better after the surgery (sent to [facility]).  It is important that you schedule an appointment with a primary care doctor to discuss starting iron supplements, monitoring your blood counts and seeing a gynecologist.

## 2023-01-23 LAB
CULTURE RESULTS: SIGNIFICANT CHANGE UP
SPECIMEN SOURCE: SIGNIFICANT CHANGE UP

## 2023-01-26 NOTE — ED STATDOCS - GASTROINTESTINAL, MLM
abdomen soft, non-tender, and non-distended. Bowel sounds present. Mirvaso Counseling: Mirvaso is a topical medication which can decrease superficial blood flow where applied. Side effects are uncommon and include stinging, redness and allergic reactions.

## 2023-01-27 LAB — SURGICAL PATHOLOGY STUDY: SIGNIFICANT CHANGE UP

## 2023-05-15 NOTE — ED ADULT TRIAGE NOTE - AS HEIGHT TYPE
stated Oxybutynin Counseling:  I discussed with the patient the risks of oxybutynin including but not limited to skin rash, drowsiness, dry mouth, difficulty urinating, and blurred vision.

## 2024-09-25 NOTE — ED ADULT TRIAGE NOTE - ESI TRIAGE ACUITY LEVEL, MLM
Alida Nixon is a 58 year old female here for  Chief Complaint   Patient presents with    Follow-up     Denies latex allergy or sensitivity.    Medication verified, no changes.  PCP and Pharmacy verified.    Social History     Tobacco Use   Smoking Status Never   Smokeless Tobacco Never     Advance Directives Filed: No    ECOG:   ECOG [09/25/24 1509]   ECOG Performance Status 0       Vitals:    Visit Vitals  LMP 08/11/2012       These vital signs are:  Within defined parameters (Per Reference \"Defined Limits Hospital Outpatient Department (HOD)\")    Height: No.  Ht Readings from Last 1 Encounters:   09/02/24 5' (1.524 m)     Weight:Yes, shoes on.  Wt Readings from Last 3 Encounters:   09/02/24 91.2 kg (201 lb)   08/26/24 95.4 kg (210 lb 5.1 oz)   07/17/24 93.9 kg (207 lb 1.6 oz)       BMI: There is no height or weight on file to calculate BMI.    REVIEW OF SYSTEMS  GENERAL:  Patient denies headache, fevers, chills, night sweats, excessive fatigue, change in appetite, weight loss, dizziness  ALLERGIC/IMMUNOLOGIC: Verified allergies: Yes  EYES:  Patient denies significant visual difficulties, double vision, blurred vision  ENT/MOUTH: Patient denies problems with hearing, sore throat, sinus drainage, mouth sores  ENDOCRINE:  Patient denies diabetes, thyroid disease, hormone replacement, hot flashes  HEMATOLOGIC/LYMPHATIC: Patient denies easy bruising, bleeding, tender lymph nodes, swollen lymph nodes  BREASTS: Patient denies abnormal masses of breast, nipple discharge, pain  RESPIRATORY:  Patient denies lung pain with breathing, cough, coughing up blood, shortness of breath  CARDIOVASCULAR:  Patient denies anginal chest pain, palpitations, shortness of breath when lying flat, peripheral edema  GASTROINTESTINAL: Patient denies abdominal pain , nausea, vomiting, diarrhea, GI bleeding, constipation, change in bowel habits, heartburn, sensation of feeling full, difficulty swallowing  : Patient denies abnormal  genital masses, blood in the urine, frequency, urgency, burning with urination, hesitancy, incontinence, vaginal bleeding, discharge  MUSCULOSKELETAL:  Patient denies joint pain, bone pain, joint swelling, redness, decreased range of motion  SKIN:  Patient denies chronic rashes, inflammation, ulcerations, skin changes, itching  NEUROLOGIC:  Patient denies loss of balance, areas of focal weakness, abnormal gait, sensory problems, numbness, tingling  PSYCHIATRIC: Patient denies insomnia, depression, anxiety    This patient reported abnormal symptoms that needed immediate verbal communication: No     4

## 2025-07-09 ENCOUNTER — EMERGENCY (EMERGENCY)
Facility: HOSPITAL | Age: 25
LOS: 1 days | End: 2025-07-09
Attending: EMERGENCY MEDICINE | Admitting: EMERGENCY MEDICINE
Payer: COMMERCIAL

## 2025-07-09 VITALS
HEART RATE: 81 BPM | WEIGHT: 154.32 LBS | HEIGHT: 64 IN | OXYGEN SATURATION: 100 % | SYSTOLIC BLOOD PRESSURE: 109 MMHG | TEMPERATURE: 99 F | DIASTOLIC BLOOD PRESSURE: 69 MMHG | RESPIRATION RATE: 18 BRPM

## 2025-07-09 PROCEDURE — 99284 EMERGENCY DEPT VISIT MOD MDM: CPT

## 2025-07-09 PROCEDURE — 99053 MED SERV 10PM-8AM 24 HR FAC: CPT

## 2025-07-10 VITALS
DIASTOLIC BLOOD PRESSURE: 71 MMHG | HEART RATE: 77 BPM | OXYGEN SATURATION: 100 % | RESPIRATION RATE: 18 BRPM | TEMPERATURE: 99 F | SYSTOLIC BLOOD PRESSURE: 111 MMHG

## 2025-07-10 PROBLEM — Z78.9 OTHER SPECIFIED HEALTH STATUS: Chronic | Status: ACTIVE | Noted: 2023-01-21

## 2025-07-10 PROCEDURE — 99283 EMERGENCY DEPT VISIT LOW MDM: CPT | Mod: 25

## 2025-07-10 PROCEDURE — 96372 THER/PROPH/DIAG INJ SC/IM: CPT

## 2025-07-10 RX ORDER — KETOROLAC TROMETHAMINE 30 MG/ML
30 INJECTION, SOLUTION INTRAMUSCULAR; INTRAVENOUS ONCE
Refills: 0 | Status: DISCONTINUED | OUTPATIENT
Start: 2025-07-10 | End: 2025-07-10

## 2025-07-10 RX ADMIN — KETOROLAC TROMETHAMINE 30 MILLIGRAM(S): 30 INJECTION, SOLUTION INTRAMUSCULAR; INTRAVENOUS at 00:48

## 2025-07-11 PROBLEM — Z00.00 ENCOUNTER FOR PREVENTIVE HEALTH EXAMINATION: Status: ACTIVE | Noted: 2025-07-11

## 2025-07-14 ENCOUNTER — OUTPATIENT (OUTPATIENT)
Dept: OUTPATIENT SERVICES | Facility: HOSPITAL | Age: 25
LOS: 1 days | End: 2025-07-14
Payer: SELF-PAY

## 2025-07-14 ENCOUNTER — APPOINTMENT (OUTPATIENT)
Age: 25
End: 2025-07-14

## 2025-07-14 VITALS
BODY MASS INDEX: 25.33 KG/M2 | OXYGEN SATURATION: 98 % | RESPIRATION RATE: 16 BRPM | TEMPERATURE: 98 F | SYSTOLIC BLOOD PRESSURE: 96 MMHG | WEIGHT: 152 LBS | HEIGHT: 65 IN | DIASTOLIC BLOOD PRESSURE: 68 MMHG | HEART RATE: 79 BPM

## 2025-07-14 DIAGNOSIS — Z00.00 ENCOUNTER FOR GENERAL ADULT MEDICAL EXAMINATION WITHOUT ABNORMAL FINDINGS: ICD-10-CM

## 2025-07-14 DIAGNOSIS — N64.52 NIPPLE DISCHARGE: ICD-10-CM

## 2025-07-14 DIAGNOSIS — N64.4 MASTODYNIA: ICD-10-CM

## 2025-07-14 DIAGNOSIS — Z86.39 PERSONAL HISTORY OF OTHER ENDOCRINE, NUTRITIONAL AND METABOLIC DISEASE: ICD-10-CM

## 2025-07-14 PROBLEM — Z80.41 FAMILY HISTORY OF MALIGNANT NEOPLASM OF OVARY: Status: ACTIVE | Noted: 2025-07-14

## 2025-07-14 PROCEDURE — 80053 COMPREHEN METABOLIC PANEL: CPT

## 2025-07-14 PROCEDURE — 84443 ASSAY THYROID STIM HORMONE: CPT

## 2025-07-14 PROCEDURE — 36415 COLL VENOUS BLD VENIPUNCTURE: CPT

## 2025-07-14 PROCEDURE — G0463: CPT

## 2025-07-14 PROCEDURE — 85025 COMPLETE CBC W/AUTO DIFF WBC: CPT

## 2025-07-14 PROCEDURE — 80061 LIPID PANEL: CPT

## 2025-07-15 LAB
ALBUMIN SERPL ELPH-MCNC: 4.3 G/DL
ALP BLD-CCNC: 105 U/L
ALT SERPL-CCNC: 20 U/L
ANION GAP SERPL CALC-SCNC: 12 MMOL/L
AST SERPL-CCNC: 17 U/L
BASOPHILS # BLD AUTO: 0.03 K/UL
BASOPHILS NFR BLD AUTO: 0.4 %
BILIRUB SERPL-MCNC: 0.3 MG/DL
BUN SERPL-MCNC: 10 MG/DL
CALCIUM SERPL-MCNC: 9.1 MG/DL
CHLORIDE SERPL-SCNC: 106 MMOL/L
CHOLEST SERPL-MCNC: 170 MG/DL
CO2 SERPL-SCNC: 21 MMOL/L
CREAT SERPL-MCNC: 0.63 MG/DL
EGFRCR SERPLBLD CKD-EPI 2021: 129 ML/MIN/1.73M2
EOSINOPHIL # BLD AUTO: 0.12 K/UL
EOSINOPHIL NFR BLD AUTO: 1.7 %
GLUCOSE SERPL-MCNC: 90 MG/DL
HCT VFR BLD CALC: 42.8 %
HDLC SERPL-MCNC: 43 MG/DL
HGB BLD-MCNC: 13.9 G/DL
IMM GRANULOCYTES NFR BLD AUTO: 0.3 %
LDLC SERPL-MCNC: 111 MG/DL
LYMPHOCYTES # BLD AUTO: 1.65 K/UL
LYMPHOCYTES NFR BLD AUTO: 24 %
MAN DIFF?: NORMAL
MCHC RBC-ENTMCNC: 27.8 PG
MCHC RBC-ENTMCNC: 32.5 G/DL
MCV RBC AUTO: 85.6 FL
MONOCYTES # BLD AUTO: 0.61 K/UL
MONOCYTES NFR BLD AUTO: 8.9 %
NEUTROPHILS # BLD AUTO: 4.45 K/UL
NEUTROPHILS NFR BLD AUTO: 64.7 %
NONHDLC SERPL-MCNC: 126 MG/DL
PLATELET # BLD AUTO: 280 K/UL
POTASSIUM SERPL-SCNC: 4.1 MMOL/L
PROT SERPL-MCNC: 6.7 G/DL
RBC # BLD: 5 M/UL
RBC # FLD: 14.4 %
SODIUM SERPL-SCNC: 139 MMOL/L
TRIGL SERPL-MCNC: 83 MG/DL
TSH SERPL-ACNC: 2.91 UIU/ML
WBC # FLD AUTO: 6.88 K/UL

## 2025-08-18 ENCOUNTER — APPOINTMENT (OUTPATIENT)
Age: 25
End: 2025-08-18

## 2025-08-18 ENCOUNTER — OUTPATIENT (OUTPATIENT)
Dept: OUTPATIENT SERVICES | Facility: HOSPITAL | Age: 25
LOS: 1 days | End: 2025-08-18
Payer: COMMERCIAL

## 2025-08-18 VITALS
TEMPERATURE: 98 F | HEART RATE: 73 BPM | DIASTOLIC BLOOD PRESSURE: 75 MMHG | WEIGHT: 151 LBS | OXYGEN SATURATION: 98 % | SYSTOLIC BLOOD PRESSURE: 112 MMHG | RESPIRATION RATE: 16 BRPM

## 2025-08-18 DIAGNOSIS — E78.5 HYPERLIPIDEMIA, UNSPECIFIED: ICD-10-CM

## 2025-08-18 DIAGNOSIS — N64.4 MASTODYNIA: ICD-10-CM

## 2025-08-18 DIAGNOSIS — Z00.00 ENCOUNTER FOR GENERAL ADULT MEDICAL EXAMINATION WITHOUT ABNORMAL FINDINGS: ICD-10-CM

## 2025-08-18 DIAGNOSIS — N64.52 NIPPLE DISCHARGE: ICD-10-CM

## 2025-08-18 PROCEDURE — G0463: CPT

## 2025-09-05 ENCOUNTER — OUTPATIENT (OUTPATIENT)
Dept: OUTPATIENT SERVICES | Facility: HOSPITAL | Age: 25
LOS: 1 days | End: 2025-09-05
Payer: COMMERCIAL

## 2025-09-05 ENCOUNTER — RESULT REVIEW (OUTPATIENT)
Age: 25
End: 2025-09-05

## 2025-09-05 ENCOUNTER — APPOINTMENT (OUTPATIENT)
Dept: ULTRASOUND IMAGING | Facility: CLINIC | Age: 25
End: 2025-09-05
Payer: COMMERCIAL

## 2025-09-05 DIAGNOSIS — N64.52 NIPPLE DISCHARGE: ICD-10-CM

## 2025-09-05 PROCEDURE — 76641 ULTRASOUND BREAST COMPLETE: CPT

## 2025-09-05 PROCEDURE — 76641 ULTRASOUND BREAST COMPLETE: CPT | Mod: 26,RT

## 2025-09-08 ENCOUNTER — APPOINTMENT (OUTPATIENT)
Age: 25
End: 2025-09-08

## 2025-09-08 VITALS
DIASTOLIC BLOOD PRESSURE: 68 MMHG | RESPIRATION RATE: 16 BRPM | TEMPERATURE: 98 F | HEART RATE: 79 BPM | SYSTOLIC BLOOD PRESSURE: 105 MMHG | OXYGEN SATURATION: 99 % | WEIGHT: 153 LBS

## 2025-09-08 DIAGNOSIS — E78.5 HYPERLIPIDEMIA, UNSPECIFIED: ICD-10-CM

## 2025-09-08 DIAGNOSIS — H53.8 OTHER VISUAL DISTURBANCES: ICD-10-CM

## 2025-09-08 DIAGNOSIS — Z00.00 ENCOUNTER FOR GENERAL ADULT MEDICAL EXAMINATION W/OUT ABNORMAL FINDINGS: ICD-10-CM

## 2025-09-08 DIAGNOSIS — N92.0 EXCESSIVE AND FREQUENT MENSTRUATION WITH REGULAR CYCLE: ICD-10-CM

## 2025-09-08 DIAGNOSIS — Z72.0 TOBACCO USE: ICD-10-CM

## 2025-09-08 RX ORDER — PHENYLEPHRINE HCL 10 MG
7 TABLET ORAL DAILY
Qty: 30 | Refills: 0 | Status: ACTIVE | COMMUNITY
Start: 2025-09-08 | End: 1900-01-01

## 2025-09-09 LAB
HCT VFR BLD CALC: 41.3 %
HGB BLD-MCNC: 13.3 G/DL
MCHC RBC-ENTMCNC: 27.1 PG
MCHC RBC-ENTMCNC: 32.2 G/DL
MCV RBC AUTO: 84.1 FL
PLATELET # BLD AUTO: 269 K/UL
RBC # BLD: 4.91 M/UL
RBC # FLD: 13.9 %
WBC # FLD AUTO: 9.6 K/UL

## (undated) DEVICE — DRSG DERMABOND 0.7ML

## (undated) DEVICE — SUT MONOCRYL 4-0 27" PS-2 UNDYED

## (undated) DEVICE — LIGASURE BLUNT TIP 37CM

## (undated) DEVICE — PACK MINOR

## (undated) DEVICE — STAPLER COVIDIEN ENDO GIA STANDARD HANDLE

## (undated) DEVICE — DRAPE 3/4 SHEET 52X76"